# Patient Record
Sex: FEMALE | Race: BLACK OR AFRICAN AMERICAN | Employment: UNEMPLOYED | URBAN - METROPOLITAN AREA
[De-identification: names, ages, dates, MRNs, and addresses within clinical notes are randomized per-mention and may not be internally consistent; named-entity substitution may affect disease eponyms.]

---

## 2020-01-03 ENCOUNTER — HOSPITAL ENCOUNTER (EMERGENCY)
Facility: CLINIC | Age: 25
Discharge: HOME | End: 2020-01-03
Attending: EMERGENCY MEDICINE | Admitting: EMERGENCY MEDICINE

## 2020-01-03 VITALS
WEIGHT: 125 LBS | DIASTOLIC BLOOD PRESSURE: 64 MMHG | HEART RATE: 59 BPM | RESPIRATION RATE: 16 BRPM | OXYGEN SATURATION: 100 % | SYSTOLIC BLOOD PRESSURE: 101 MMHG | TEMPERATURE: 97.3 F

## 2020-01-03 DIAGNOSIS — R11.10 VOMITING: ICD-10-CM

## 2020-01-03 DIAGNOSIS — Z32.01 PREGNANCY TEST POSITIVE: ICD-10-CM

## 2020-01-03 DIAGNOSIS — R11.0 NAUSEA: ICD-10-CM

## 2020-01-03 LAB
ALBUMIN UR-MCNC: 30 MG/DL
APPEARANCE UR: ABNORMAL
BACTERIA #/AREA URNS HPF: ABNORMAL /HPF
BILIRUB UR QL STRIP: NEGATIVE
COLOR UR AUTO: YELLOW
GLUCOSE UR STRIP-MCNC: NEGATIVE MG/DL
HCG UR QL: POSITIVE
HGB UR QL STRIP: NEGATIVE
INTERNAL QC OK POCT: YES
KETONES UR STRIP-MCNC: NEGATIVE MG/DL
LEUKOCYTE ESTERASE UR QL STRIP: NEGATIVE
MUCOUS THREADS #/AREA URNS LPF: PRESENT /LPF
NITRATE UR QL: NEGATIVE
PH UR STRIP: 7 PH (ref 5–7)
RBC #/AREA URNS AUTO: 2 /HPF (ref 0–2)
SOURCE: ABNORMAL
SP GR UR STRIP: 1.03 (ref 1–1.03)
SQUAMOUS #/AREA URNS AUTO: 9 /HPF (ref 0–1)
UROBILINOGEN UR STRIP-MCNC: 2 MG/DL (ref 0–2)
WBC #/AREA URNS AUTO: 2 /HPF (ref 0–5)

## 2020-01-03 PROCEDURE — 25000128 H RX IP 250 OP 636: Performed by: FAMILY MEDICINE

## 2020-01-03 PROCEDURE — 99283 EMERGENCY DEPT VISIT LOW MDM: CPT | Mod: Z6 | Performed by: EMERGENCY MEDICINE

## 2020-01-03 PROCEDURE — 81025 URINE PREGNANCY TEST: CPT | Performed by: FAMILY MEDICINE

## 2020-01-03 PROCEDURE — 99283 EMERGENCY DEPT VISIT LOW MDM: CPT | Performed by: EMERGENCY MEDICINE

## 2020-01-03 PROCEDURE — 81001 URINALYSIS AUTO W/SCOPE: CPT | Performed by: STUDENT IN AN ORGANIZED HEALTH CARE EDUCATION/TRAINING PROGRAM

## 2020-01-03 PROCEDURE — 81025 URINE PREGNANCY TEST: CPT | Performed by: EMERGENCY MEDICINE

## 2020-01-03 RX ORDER — SODIUM CHLORIDE 9 MG/ML
1000 INJECTION, SOLUTION INTRAVENOUS CONTINUOUS
Status: DISCONTINUED | OUTPATIENT
Start: 2020-01-03 | End: 2020-01-03

## 2020-01-03 RX ORDER — ONDANSETRON 4 MG/1
4 TABLET, ORALLY DISINTEGRATING ORAL ONCE
Status: COMPLETED | OUTPATIENT
Start: 2020-01-03 | End: 2020-01-03

## 2020-01-03 RX ORDER — PRENATAL VIT/IRON FUM/FOLIC AC 27MG-0.8MG
1 TABLET ORAL DAILY
Qty: 30 TABLET | Refills: 0 | Status: SHIPPED | OUTPATIENT
Start: 2020-01-03

## 2020-01-03 RX ORDER — ONDANSETRON 4 MG/1
4 TABLET, FILM COATED ORAL EVERY 8 HOURS PRN
Qty: 21 TABLET | Refills: 0 | Status: SHIPPED | OUTPATIENT
Start: 2020-01-03 | End: 2020-01-10

## 2020-01-03 RX ADMIN — ONDANSETRON 4 MG: 4 TABLET, ORALLY DISINTEGRATING ORAL at 13:22

## 2020-01-03 SDOH — HEALTH STABILITY: MENTAL HEALTH: HOW OFTEN DO YOU HAVE A DRINK CONTAINING ALCOHOL?: NEVER

## 2020-01-03 ASSESSMENT — ENCOUNTER SYMPTOMS
DYSURIA: 0
ACTIVITY CHANGE: 0
ABDOMINAL DISTENTION: 0
LIGHT-HEADEDNESS: 0
ALLERGIC/IMMUNOLOGIC NEGATIVE: 1
DIARRHEA: 0
ARTHRALGIAS: 0
PSYCHIATRIC NEGATIVE: 1
VOMITING: 1
CONSTIPATION: 0
FEVER: 0
SHORTNESS OF BREATH: 0
HEADACHES: 0
DIAPHORESIS: 0
HEMATURIA: 0
MYALGIAS: 0
EYES NEGATIVE: 1
ENDOCRINE NEGATIVE: 1
SORE THROAT: 0
CHEST TIGHTNESS: 0
DIZZINESS: 0
DIFFICULTY URINATING: 0
NECK PAIN: 0
WEAKNESS: 0
ABDOMINAL PAIN: 0
FREQUENCY: 0
CHILLS: 0
APPETITE CHANGE: 0
COUGH: 1
NAUSEA: 1
PALPITATIONS: 0

## 2020-01-03 NOTE — DISCHARGE INSTRUCTIONS
Thank you for choosing [facility].     Please call [facility] [phone] to schedule a prenatal appointment.     For nausea and vomiting, you may take Zofran up to 3 times per day.     Please take 1 prenatal vitamin every day.     Please closely monitor for further symptoms. Return to the Emergency Department if you develop any new or worsening signs or symptoms.    Please follow up with your primary care physician or clinic.

## 2020-01-03 NOTE — ED PROVIDER NOTES
History     Chief Complaint   Patient presents with     Vomiting     pt has vomiting and nausea last week, thinks she may be pregnant. LMP 5-6 weeks ago       History is obtained from the patient using an [employer] interpret via iPad.     MARIANN Murillo is a 25 year old female  with no significant PMH who presents with 1 week of vomiting. She states she has vomited every time she has tried to eat something for about 1 week, and is vomiting 3-5 times per day. No blood, no bile in vomit. Associated with a non-productive cough x 1 week. No fever/chills, sore throat/runny nose/headache, abdominal pain, diarrhea, vaginal bleeding or discharge, no dysuria/hematuria/urinary frequency or urgency, or lightheadedness/dizziness.     She states her last menstrual period was about 1.5 months ago. She suspects she is pregnant as she had frequent vomiting during her last 2 pregnancies. She moved to the  from [facility] 2 months ago.     I have reviewed the Medications, Allergies, Past Medical and Surgical History, and Social History in the Epic system.    Review of Systems   Constitutional: Negative for activity change, appetite change, chills, diaphoresis and fever.   HENT: Negative for congestion, nosebleeds, sneezing and sore throat.    Eyes: Negative.    Respiratory: Positive for cough. Negative for chest tightness and shortness of breath.    Cardiovascular: Negative for chest pain, palpitations and leg swelling.   Gastrointestinal: Positive for nausea and vomiting. Negative for abdominal distention, abdominal pain, constipation and diarrhea.   Endocrine: Negative.    Genitourinary: Negative for difficulty urinating, dysuria, frequency, hematuria, pelvic pain, vaginal bleeding and vaginal discharge.   Musculoskeletal: Negative for arthralgias, myalgias and neck pain.   Skin: Negative.    Allergic/Immunologic: Negative.    Neurological: Negative for dizziness, weakness, light-headedness and headaches.    Psychiatric/Behavioral: Negative.        Physical Exam   BP: 100/63  Pulse: 72  Temp: 96.9  F (36.1  C)  Resp: 14  Weight: 56.7 kg (125 lb)  SpO2: 98 %      Physical Exam  Constitutional:       General: She is not in acute distress.     Appearance: Normal appearance. She is not ill-appearing or diaphoretic.   HENT:      Head: Atraumatic.      Nose: Nose normal. No congestion or rhinorrhea.      Mouth/Throat:      Mouth: Mucous membranes are dry.   Neck:      Musculoskeletal: Neck supple.   Cardiovascular:      Rate and Rhythm: Normal rate and regular rhythm.      Pulses: Normal pulses.   Pulmonary:      Effort: Pulmonary effort is normal.      Breath sounds: Normal breath sounds. No wheezing or rales.   Abdominal:      Palpations: Abdomen is soft.      Tenderness: There is no abdominal tenderness.   Musculoskeletal: Normal range of motion.   Skin:     General: Skin is dry.      Capillary Refill: Capillary refill takes less than 2 seconds.      Coloration: Skin is not jaundiced or pale.      Findings: No rash.   Neurological:      General: No focal deficit present.      Mental Status: She is alert.      Motor: No weakness.   Psychiatric:         Mood and Affect: Mood normal.         Behavior: Behavior normal.         ED Course     Results for orders placed or performed during the hospital encounter of 01/03/20   UA reflex to Microscopic and Culture     Status: Abnormal   Result Value Ref Range    Color Urine Yellow     Appearance Urine Slightly Cloudy     Glucose Urine Negative NEG^Negative mg/dL    Bilirubin Urine Negative NEG^Negative    Ketones Urine Negative NEG^Negative mg/dL    Specific Gravity Urine 1.033 1.003 - 1.035    Blood Urine Negative NEG^Negative    pH Urine 7.0 5.0 - 7.0 pH    Protein Albumin Urine 30 (A) NEG^Negative mg/dL    Urobilinogen mg/dL 2.0 0.0 - 2.0 mg/dL    Nitrite Urine Negative NEG^Negative    Leukocyte Esterase Urine Negative NEG^Negative    Source Midstream Urine     RBC Urine 2 0 -  2 /HPF    WBC Urine 2 0 - 5 /HPF    Bacteria Urine Few (A) NEG^Negative /HPF    Squamous Epithelial /HPF Urine 9 (H) 0 - 1 /HPF    Mucous Urine Present (A) NEG^Negative /LPF   hCG qual urine POCT     Status: Abnormal   Result Value Ref Range    HCG Qual Urine Positive neg    Internal QC OK Yes             Assessments & Plan (with Medical Decision Making)   Naomi is a 25 year old female with no significant PMH presenting with 1 week of nausea and vomiting. Her vitals are within normal limits and she is hemodynamically stable. Her pregnancy test in the ED was positive.  Patient received one dose of Zofran in the ED with improvement in her symptoms. She was able to tolerate PO fluids. Her urinalysis was negative for leuk esterase and nitrites, positive for few bacteria. Patient advised to have repeat UA done in prenatal clinic within 1 week. Patient had no other symptoms concerning for underlying etiology of vomiting other than pregnancy. Patient was discharged with prescriptions for prenatal vitamins, Zofran, and instructions for setting up prenatal care. She was encouraged to drink fluids as tolerated and return to the ED if symptoms worsen.    Patient signed out to attending at 16:59      I have reviewed the nursing notes.    I have reviewed the findings, diagnosis, plan and need for follow up with the patient.    New Prescriptions    ONDANSETRON (ZOFRAN) 4 MG TABLET    Take 1 tablet (4 mg) by mouth every 8 hours as needed for nausea or vomiting    PRENATAL VIT-FE FUMARATE-FA (PRENATAL MULTIVITAMIN W/IRON) 27-0.8 MG TABLET    Take 1 tablet by mouth daily       Final diagnoses:   Vomiting   Nausea   Pregnancy test positive       1/3/2020   [facility], [place], EMERGENCY DEPARTMENT     Key Herrera MD  Resident  01/03/20 1647       Key Herrera MD  Resident  01/03/20 1700

## 2020-01-03 NOTE — ED AVS SNAPSHOT
[facility], [place], Emergency Department  [address]  [facility] [address]  Phone:  [phone]  Fax:  [phone]                                    Naomi Murillo   MRN: [id number]    Department:  [facility], Emergency Department   Date of Visit:  1/3/2020           After Visit Summary Signature Page    I have received my discharge instructions, and my questions have been answered. I have discussed any challenges I see with this plan with the nurse or doctor.    ..........................................................................................................................................  Patient/Patient Representative Signature      ..........................................................................................................................................  Patient Representative Print Name and Relationship to Patient    ..................................................               ................................................  Date                                   Time    ..........................................................................................................................................  Reviewed by Signature/Title    ...................................................              ..............................................  Date                                               Time          22EPIC Rev 08/18